# Patient Record
Sex: MALE | Race: WHITE
[De-identification: names, ages, dates, MRNs, and addresses within clinical notes are randomized per-mention and may not be internally consistent; named-entity substitution may affect disease eponyms.]

---

## 2017-03-01 ENCOUNTER — HOSPITAL ENCOUNTER (EMERGENCY)
Dept: HOSPITAL 80 - FED | Age: 27
LOS: 1 days | Discharge: LEFT BEFORE DISCHARGE | End: 2017-03-02
Payer: SELF-PAY

## 2017-03-01 VITALS — DIASTOLIC BLOOD PRESSURE: 99 MMHG | SYSTOLIC BLOOD PRESSURE: 136 MMHG | TEMPERATURE: 98.1 F

## 2017-03-01 DIAGNOSIS — Z91.010: ICD-10-CM

## 2017-03-01 DIAGNOSIS — T78.40XA: Primary | ICD-10-CM

## 2017-03-01 DIAGNOSIS — J45.909: ICD-10-CM

## 2017-03-02 VITALS — RESPIRATION RATE: 18 BRPM | OXYGEN SATURATION: 94 % | HEART RATE: 104 BPM

## 2017-03-02 NOTE — EDPHY
H & P


Stated Complaint: possible allergic reaction


Time Seen by Provider: 03/01/17 23:45


HPI/ROS: 





HPI


The patient presents with concern for allergic reaction.  He was eating pizza 

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  He said when he was 

walking at about 10:40 p.m. he began to feel somewhat "out of it".  He says his 

skin felt itchy and he felt slightly short of breath.  He had to stop and when 

he got to work several of the in please were concerned and encouraged him to go 

to the emergency room.  He now says he feels an itchy rash on his chest, arms 

and legs.  He also feels slightly short of breath.  He denies any wheezing.  He 

has no difficulty swallowing or sensation that his throat is swelling.  He has 

not had any vomiting.  He does not feel dizzy currently.





He has a history of childhood nut allergy, but is not sure what sort of 

reaction he had.





REVIEW OF SYSTEMS


Constitutional:  No fever, no chills.


Eyes:  No discharge.


ENT:  No sore throat.


Cardiovascular:  No chest pain, no palpitations.


Respiratory:  No cough, no shortness of breath.


Gastrointestinal:  No abdominal pain, no vomiting.


Genitourinary:  No hematuria.


Musculoskeletal:  No back pain.


Skin:  Positive for rashes.


Neurological:  No headache.





PMHx:  Asthma





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











PHYSICAL


General Appearance: Alert, no distress


Eyes: Pupils equal and round no pallor or injection


ENT, Mouth: Mucous membranes moist, mild uvular edema, posterior pharynx is 

slightly erythematous otherwise


Respiratory: There are no retractions, lungs are clear to auscultation


Cardiovascular:  Tachycardic rate and regular rhythm 


Gastrointestinal:  Abdomen is soft and non-tender, no masses, bowel sounds 

normal 


Neurological:  A&O, moves all extremities


Skin:  Warm and dry, diffuse urticarial rash on right anterior chest wall, legs

, hands


Musculoskeletal: Neck is supple non tender 


Extremities:  symmetrical, full range of motion 


Psychiatric:  Patient is oriented X 3, there is no agitation 





Source: Patient


Exam Limitations: No limitations





- Personal History


Current Tetanus/Diphtheria Vaccine: Yes


Current Tetanus Diphtheria and Acellular Pertussis (TDAP): Yes





- Medical/Surgical History


Hx Asthma: Yes


Hx Chronic Respiratory Disease: No


Hx Diabetes: No


Hx Cardiac Disease: No


Hx Renal Disease: No


Hx Cirrhosis: No


Hx Alcoholism: No


Hx HIV/AIDS: No


Hx Splenectomy or Spleen Trauma: No


Other PMH: neg





- Social History


Smoking Status: Never smoked


Constitutional: 


 Initial Vital Signs











Temperature (C)  36.7 C   03/01/17 23:51


 


Heart Rate  118 H  03/01/17 23:51


 


Respiratory Rate  20   03/01/17 23:51


 


Blood Pressure  136/99 H  03/01/17 23:51


 


O2 Sat (%)  93   03/01/17 23:51








 











O2 Delivery Mode               Room Air














Allergies/Adverse Reactions: 


 





peanut Allergy (Verified 03/01/17 23:47)


 








Home Medications: 














 Medication  Instructions  Recorded


 


EPINEPHRINE [EPIPEN] 0.3 mg IM ONCE #2 syr 03/02/17














Medical Decision Making


Differential Diagnosis: 





This is a 26-year-old male with history of nut allergy, asthma who presents 

with concern for an allergic reaction after eating pizza for dinner tonight.  

His onset of symptoms was just prior to arrival with itchy rash, shortness of 

breath, generally feeling unwell.





On exam, he is tachycardic, initially hypoxic, however this improved without 

intervention.  He does have urticaria and uvular edema.





I am concerned he is experiencing anaphylaxis versus allergic reaction.





He says he is feeling much better than when his symptoms initially started.  He 

would like to go home.  I explained to him that standard treatment for a 

presentation such as this would include epinephrine, Solu-Medrol, Benadryl, 

Pepcid.  He is concerned about his bill and does not like going to the doctor.  

He says he will return if he is worse in any way.  I have explained that he 

will have to go against medical advice and he is in agreement with this.  He 

accepts the risks of being discharged against advice which include death.  He 

says he will go directly to the pharmacy with a co-worker and take Benadryl and 

Pepcid.  I have given him a prescription for epi pens and he says he will be 

able to fill this tonight.  I have encouraged him to return for further 

evaluation if you would like.





Departure





- Departure


Disposition: Against Medical Advice


Clinical Impression: 


Allergic reaction


Qualifiers:


 Encounter type: initial encounter Qualified Code(s): T78.40XA - Allergy, 

unspecified, initial encounter





Condition: Fair


Instructions:  Anaphylaxis (ED)


Additional Instructions: 


Please return to the emergency room if you would like to be evaluated.  I 

recommend that you take Benadryl 50 mg and Pepcid 20 mg as soon as possible.  

Please use the EpiPen if your symptoms are worse in any way.


Referrals: 


NONE *PRIMARY CARE P,. [Primary Care Provider] - As per Instructions


Prescriptions: 


EPINEPHRINE [EPIPEN] 0.3 mg IM ONCE #2 syr

## 2018-02-06 ENCOUNTER — HOSPITAL ENCOUNTER (OUTPATIENT)
Dept: HOSPITAL 80 - BMCIMAGING | Age: 28
End: 2018-02-06
Attending: NURSE PRACTITIONER
Payer: COMMERCIAL

## 2018-02-06 DIAGNOSIS — S59.902A: Primary | ICD-10-CM
